# Patient Record
Sex: FEMALE | Race: WHITE | NOT HISPANIC OR LATINO | URBAN - NONMETROPOLITAN AREA
[De-identification: names, ages, dates, MRNs, and addresses within clinical notes are randomized per-mention and may not be internally consistent; named-entity substitution may affect disease eponyms.]

---

## 2017-01-09 NOTE — PROCEDURE NOTE: CLINICAL
Prior to injection, risks/benefits/alternatives discussed including infection, loss of vision, hemorrhage, cataract, glaucoma, retinal tears or detachment and patient wished to proceed. Topical anesthesia was induced with Alcaine. Additional anesthesia was achieved using drop(s) or injection checked above. A drop of Povidone-iodine 5% ophthalmic solution was instilled over the injection site and in the inferior fornix. Betadine prep was performed. A single use vial of intravitreal Lucentis 0.5mg/0.05ml was used and excess discarded. The needle was passed 3.0 mm posterior to the limbus in pseudophakic patients, and 3.5 mm posterior to the limbus in phakic patients. The remainder of the Lucentis 0.5mg in the single-use vial was then discarded in a medical waste disposal container. The eye was irrigated with sterile irrigating solution. Patient tolerated the procedure well. There were no complications. Post procedure instructions given. Injection Time 1:48 PM. CF vision checked. The patient was instructed to return for re-evaluation in approximately 4-12 weeks depending on his/her condition and was told to call immediately if vision decreases and/or if his/her eye becomes red, painful, and/or light sensitive. The patient was instructed to go to the emergency room or call 911 if unable to reach the doctor within an hour or two of trying or calling. The patient was instructed to use Artificial Tears q.i.d. p.r.n for comfort.

## 2017-03-07 NOTE — PROCEDURE NOTE: CLINICAL
PROCEDURE NOTE: Lucentis 0.5 mg OD. Diagnosis: Branch Retinal Vein Occlusion with Macular Edema. Anesthesia: Topical. Prep: Betadine Flush. Prior to injection, risks/benefits/alternatives discussed including infection, loss of vision, hemorrhage, cataract, glaucoma, retinal tears or detachment and patient wished to proceed. Topical anesthesia was induced with Alcaine. Additional anesthesia was achieved using drop(s) or injection checked above. A drop of Povidone-iodine 5% ophthalmic solution was instilled over the injection site and in the inferior fornix. Betadine prep was performed. A single use vial of intravitreal Lucentis 0.5mg/0.05ml was used and excess discarded. The needle was passed 3.0 mm posterior to the limbus in pseudophakic patients, and 3.5 mm posterior to the limbus in phakic patients. The remainder of the Lucentis 0.5mg in the single-use vial was then discarded in a medical waste disposal container. The eye was irrigated with sterile irrigating solution. Patient tolerated the procedure well. There were no complications. Post procedure instructions given. Injection Time 2:55 PM. CF vision checked. The patient was instructed to return for re-evaluation in approximately 4-12 weeks depending on his/her condition and was told to call immediately if vision decreases and/or if his/her eye becomes red, painful, and/or light sensitive. The patient was instructed to go to the emergency room or call 911 if unable to reach the doctor within an hour or two of trying or calling. The patient was instructed to use Artificial Tears q.i.d. p.r.n for comfort. Corie Whitfield

## 2017-05-01 NOTE — PROCEDURE NOTE: CLINICAL
PROCEDURE NOTE: Lucentis 0.5 mg OD. Diagnosis: Branch Retinal Vein Occlusion with Macular Edema. Prior to injection, risks/benefits/alternatives discussed including infection, loss of vision, hemorrhage, cataract, glaucoma, retinal tears or detachment and patient wished to proceed. Topical anesthesia was induced with Alcaine. Additional anesthesia was achieved using drop(s) or injection checked above. A drop of Povidone-iodine 5% ophthalmic solution was instilled over the injection site and in the inferior fornix. Betadine prep was performed. A single use vial of intravitreal Lucentis 0.5mg/0.05ml was used and excess discarded. The needle was passed 3.0 mm posterior to the limbus in pseudophakic patients, and 3.5 mm posterior to the limbus in phakic patients. The remainder of the Lucentis 0.5mg in the single-use vial was then discarded in a medical waste disposal container. The eye was irrigated with sterile irrigating solution. Patient tolerated the procedure well. There were no complications. Post procedure instructions given. Injection Time *. CF vision checked. The patient was instructed to return for re-evaluation in approximately 4-12 weeks depending on his/her condition and was told to call immediately if vision decreases and/or if his/her eye becomes red, painful, and/or light sensitive. The patient was instructed to go to the emergency room or call 911 if unable to reach the doctor within an hour or two of trying or calling. The patient was instructed to use Artificial Tears q.i.d. p.r.n for comfort. Mj Sullivan

## 2017-10-27 PROBLEM — H43.823: Noted: 2019-01-04

## 2017-10-27 PROBLEM — H40.1131: Noted: 2017-10-27

## 2017-10-27 PROBLEM — H04.123: Noted: 2017-10-27

## 2017-10-27 PROBLEM — Z96.1: Noted: 2017-10-27

## 2019-01-04 ENCOUNTER — IMPORTED ENCOUNTER (OUTPATIENT)
Dept: URBAN - NONMETROPOLITAN AREA CLINIC 1 | Facility: CLINIC | Age: 74
End: 2019-01-04

## 2019-01-04 PROCEDURE — 99214 OFFICE O/P EST MOD 30 MIN: CPT

## 2019-01-04 PROCEDURE — 92083 EXTENDED VISUAL FIELD XM: CPT

## 2019-01-04 PROCEDURE — 92134 CPTRZ OPH DX IMG PST SGM RTA: CPT

## 2019-01-04 NOTE — PATIENT DISCUSSION
POAG OUDiscussed diagnosis in detail with patient. Discussed the chronic progressive nature of this disease and various treatment options with patient. VF done today; central depression OU recommend OCT today to r/o ARMDOCT done today; shows VMT OU IOP stable at 10 OU. Cup to Disc ratio is 0.8 OU. Continue Simbrinza QD OU (ok to use at night)Continue to monitor. RTC 3 months with Optos Pseudophakia OUDiscussed diagnosis in detail with patient. Both intraocular lenses in place today and stable. Continue to monitor. CHRISTY OUDiscussed the diagnosis with patient. Discussed the chronic nature and treatment options with the patient. 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 Continue Systane BID OU may need to increase tears throughout the day. May rec pt see Dr. Abraham Dow if further Tx needed and no improvement at next visit. Continue to monitor. Visual field defect OURecommend OCT today; shows VMT OU No treatment indicated at this time. Continue to monitor.; 's Notes: MR 10/27/17DFE 1/4/19Optos 6/18/18OCT 10/1/18Pach 5/20/15VF 1/4/19Gonio 10/1/18

## 2019-04-05 ENCOUNTER — IMPORTED ENCOUNTER (OUTPATIENT)
Dept: URBAN - NONMETROPOLITAN AREA CLINIC 1 | Facility: CLINIC | Age: 74
End: 2019-04-05

## 2019-04-05 PROCEDURE — 99214 OFFICE O/P EST MOD 30 MIN: CPT

## 2019-04-05 PROCEDURE — 92250 FUNDUS PHOTOGRAPHY W/I&R: CPT

## 2019-04-05 NOTE — PATIENT DISCUSSION
POAG OUDiscussed diagnosis in detail with patient. Discussed the chronic progressive nature of this disease and various treatment options with patient. Optos done today; stable with glaucomatous cupping OUIOP stable at 11 OU. Cup to Disc ratio is 0.8 OU. Continue Simbrinza QD OU (ok to use at night)Continue to monitor. RTC 3 months with OCTPseudophakia OUDiscussed diagnosis in detail with patient. Both intraocular lenses in place today and stable. Continue to monitor. CHRISTY OUDiscussed the diagnosis with patient. Discussed the chronic nature and treatment options with the patient. 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 Continue Systane BID OU may need to increase tears throughout the day. May rec pt see Dr. Zahida Alanis if further Tx needed and no improvement at next visit. Continue to monitor. VMT OU Discussed diagnosis in detail with patient. Appears stable on today's exam.No treatment indicated at this time. Continue to monitor. Presbyopia OUDiscussed refractive status in detail with patient. New glasses Rx given today. Continue to monitor.; 's Notes: MR 4/5/19DFE  4/5/19Optos 4/5/19OCT 10/1/18Pach 5/20/15VF 1/4/19Gonio 10/1/18

## 2019-04-29 ENCOUNTER — IMPORTED ENCOUNTER (OUTPATIENT)
Dept: URBAN - NONMETROPOLITAN AREA CLINIC 1 | Facility: CLINIC | Age: 74
End: 2019-04-29

## 2019-04-29 PROBLEM — H40.1131: Noted: 2019-04-29

## 2019-04-29 PROBLEM — Z96.1: Noted: 2019-04-29

## 2019-04-29 PROBLEM — H04.123: Noted: 2019-04-29

## 2019-04-29 PROBLEM — H43.823: Noted: 2019-01-04

## 2019-04-29 NOTE — PATIENT DISCUSSION
GLRx check vs. DESDiscussed findings with patient today. Patient came in today complaining of blurry vision. Signs/symptoms associated discussed with dryness. She currently uses TheraTears TID-QID PRN but not working well alone. Sample of Reading given today use OU BID. Continue TheraTears as well. Once we get the dryness under better control we will recheck GLRx. RTC 2 weeks for recheck. --------- NOTES FROM BELOW ARE FROM LAST VISIT ---------------POAG OUDiscussed diagnosis in detail with patient. Discussed the chronic progressive nature of this disease and various treatment options with patient. Optos done today; stable with glaucomatous cupping OUIOP stable at 11 OU. Cup to Disc ratio is 0.8 OU. Continue Simbrinza QD OU (ok to use at night)Continue to monitor. RTC 3 months with OCTPseudophakia OUDiscussed diagnosis in detail with patient. Both intraocular lenses in place today and stable. Continue to monitor. CHRISTY OUDiscussed the diagnosis with patient. Discussed the chronic nature and treatment options with the patient. 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 Continue Systane BID OU may need to increase tears throughout the day. May rec pt see Dr. Megan Constantino if further Tx needed and no improvement at next visit. Continue to monitor. VMT OU Discussed diagnosis in detail with patient. Appears stable on today's exam.No treatment indicated at this time. Continue to monitor. Presbyopia OUDiscussed refractive status in detail with patient. New glasses Rx given today. Continue to monitor.; 's Notes: MR 4/5/19DFE  4/5/19Optos 4/5/19OCT 10/1/18Pach 5/20/15VF 1/4/19Gonio 10/1/18

## 2019-05-13 ENCOUNTER — IMPORTED ENCOUNTER (OUTPATIENT)
Dept: URBAN - NONMETROPOLITAN AREA CLINIC 1 | Facility: CLINIC | Age: 74
End: 2019-05-13

## 2019-05-13 PROCEDURE — 92012 INTRM OPH EXAM EST PATIENT: CPT

## 2019-05-13 NOTE — PATIENT DISCUSSION
CHRISTY OUDiscussed findings with patient today. Signs/symptoms associated discussed with dryness. SPK improved on today's examContinue Xiidra BID OU samples given today. Continue TheraTears as well. Once we get the dryness under better control we will recheck GLRx. RTC 4 weeks for recheck. --------- NOTES FROM BELOW ARE FROM LAST VISIT ---------------POAG OUDiscussed diagnosis in detail with patient. Discussed the chronic progressive nature of this disease and various treatment options with patient. Optos done today; stable with glaucomatous cupping OUIOP stable at 11 OU. Cup to Disc ratio is 0.8 OU. Continue Simbrinza QD OU (ok to use at night)Continue to monitor. RTC 3 months with OCTPseudophakia OUDiscussed diagnosis in detail with patient. Both intraocular lenses in place today and stable. Continue to monitor. CHRISTY OUDiscussed the diagnosis with patient. Discussed the chronic nature and treatment options with the patient. 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 Continue Systane BID OU may need to increase tears throughout the day. May rec pt see Dr. Benjie Homans if further Tx needed and no improvement at next visit. Continue to monitor. VMT OU Discussed diagnosis in detail with patient. Appears stable on today's exam.No treatment indicated at this time. Continue to monitor. Presbyopia OUDiscussed refractive status in detail with patient. .Continue to monitor.; 's Notes: MR 4/5/19DFE  4/5/19Optos 4/5/19OCT 10/1/18Pach 5/20/15VF 1/4/19Gonio 10/1/18

## 2019-06-17 ENCOUNTER — IMPORTED ENCOUNTER (OUTPATIENT)
Dept: URBAN - NONMETROPOLITAN AREA CLINIC 1 | Facility: CLINIC | Age: 74
End: 2019-06-17

## 2019-06-17 PROCEDURE — 92012 INTRM OPH EXAM EST PATIENT: CPT

## 2019-06-17 NOTE — PATIENT DISCUSSION
CHRISTY OUDiscussed findings with patient today. Signs/symptoms associated discussed with dryness. SPK improved on today's examContinue Xiidra BID OU samples given today. Continue TheraTears as well. Continue to monitor. POAG OUDiscussed diagnosis in detail with patient. Discussed the chronic progressive nature of this disease and various treatment options with patient. IOP stable at 12 OU. Cup to Disc ratio is 0.8 OU. Continue Simbrinza QD OU (ok to use at night)Continue to monitor. RTC 3 months with OCTPseudophakia OUDiscussed diagnosis in detail with patient. Both intraocular lenses in place today and stable. Continue to monitor. VMT OU Discussed diagnosis in detail with patient. Appears stable on today's exam.No treatment indicated at this time. Continue to monitor. Presbyopia OUDiscussed refractive status in detail with patient. .Continue to monitor.; 's Notes: MR 4/5/19DFE  4/5/19Optos 4/5/19OCT 10/1/18Pach 5/20/15VF 1/4/19Gonio 10/1/18

## 2019-07-08 ENCOUNTER — IMPORTED ENCOUNTER (OUTPATIENT)
Dept: URBAN - NONMETROPOLITAN AREA CLINIC 1 | Facility: CLINIC | Age: 74
End: 2019-07-08

## 2019-07-08 PROBLEM — Z96.1: Noted: 2019-04-29

## 2019-07-08 PROBLEM — H04.123: Noted: 2019-07-08

## 2019-07-08 PROBLEM — Z96.1: Noted: 2021-05-17

## 2019-07-08 PROBLEM — H04.123: Noted: 2021-05-17

## 2019-07-08 PROBLEM — H43.823: Noted: 2019-01-04

## 2019-07-08 PROBLEM — H40.1131: Noted: 2019-07-08

## 2019-07-08 PROBLEM — H35.3131: Noted: 2021-05-17

## 2019-07-08 PROBLEM — H43.823: Noted: 2021-05-17

## 2019-07-08 PROCEDURE — 92133 CPTRZD OPH DX IMG PST SGM ON: CPT

## 2019-07-08 PROCEDURE — 99214 OFFICE O/P EST MOD 30 MIN: CPT

## 2019-07-08 NOTE — PATIENT DISCUSSION
POAG OUDiscussed diagnosis in detail with patient. Discussed the chronic progressive nature of this disease and various treatment options with patient. OCT done today; borderline superior NFL thinning OU. IOP stable at 13 OD and 12 OS. Cup to Disc ratio is 0.8 OU. Continue Simbrinza QD OU (ok to use at night)Continue to monitor. RTC 3 months with Gonio CHRISTY OUDiscussed findings with patient today. Signs/symptoms associated discussed with dryness. SPK improved on today's examContinue Xiidra BID OU. Continue TheraTears as well. Continue to monitor. Pseudophakia OUDiscussed diagnosis in detail with patient. Both intraocular lenses in place today and stable. Continue to monitor. VMT OU Discussed diagnosis in detail with patient. Appears stable on today's exam.No treatment indicated at this time. Continue to monitor. Presbyopia OUDiscussed refractive status in detail with patient. .Continue to monitor.; 's Notes: MR 4/5/19DFE  4/5/19Optos 4/5/19OCT 7/8/19Pach 5/20/15VF 1/4/19Gonio 10/1/18

## 2019-10-18 ENCOUNTER — IMPORTED ENCOUNTER (OUTPATIENT)
Dept: URBAN - NONMETROPOLITAN AREA CLINIC 1 | Facility: CLINIC | Age: 74
End: 2019-10-18

## 2019-10-18 PROCEDURE — 99214 OFFICE O/P EST MOD 30 MIN: CPT

## 2019-10-18 PROCEDURE — 92020 GONIOSCOPY: CPT

## 2019-10-18 NOTE — PATIENT DISCUSSION
POAG OUDiscussed diagnosis in detail with patient. Discussed the chronic progressive nature of this disease and various treatment options with patient. 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 IOP stable at 12 OU. Cup to Disc ratio is 0.8 OU. Continue Simbrinza QD OU (ok to use at night)Continue to monitor. RTC 3 months with VF 24-2DES OUDiscussed findings with patient today. Signs/symptoms associated discussed with dryness. 2+SPK noted OUStart Lotemax SM BID OU use until sample is empty. Continue Xiidra BID OU. Continue TheraTears as well. Continue to monitor. Pseudophakia OUDiscussed diagnosis in detail with patient. Both intraocular lenses in place today and stable. Continue to monitor. VMT OU Discussed diagnosis in detail with patient. Appears stable on today's exam.No treatment indicated at this time. Continue to monitor. Presbyopia OUDiscussed refractive status in detail with patient. .Continue to monitor.; 's Notes: MR 4/5/19DFE  4/5/19Optos 4/5/19OCT 7/8/19Pach 5/20/15VF 1/4/19Gonio 10/18/19

## 2020-01-10 ENCOUNTER — IMPORTED ENCOUNTER (OUTPATIENT)
Dept: URBAN - NONMETROPOLITAN AREA CLINIC 1 | Facility: CLINIC | Age: 75
End: 2020-01-10

## 2020-01-10 PROCEDURE — 99214 OFFICE O/P EST MOD 30 MIN: CPT

## 2020-01-10 PROCEDURE — 92083 EXTENDED VISUAL FIELD XM: CPT

## 2020-01-10 NOTE — PATIENT DISCUSSION
POAG OUDiscussed diagnosis in detail with patient. Discussed the chronic progressive nature of this disease and various treatment options with patient. VF done today; central defect OU. Recommend refer to [facility] for further evaluation. IOP stable at 11 OD and 12 OS. Cup to Disc ratio is 0.8 OU. Continue Simbrinza QD OU (ok to use at night)Continue to monitor. CHRISTY OUDiscussed findings with patient today. Signs/symptoms associated discussed with dryness. Trace SPK noted OUContinue Doxycycline 50 mg. Continue Xiidra BID OU. Continue TheraTears as well. Continue to monitor. Pseudophakia OUDiscussed diagnosis in detail with patient. Both intraocular lenses in place today and stable. Continue to monitor. VMT OU Discussed diagnosis in detail with patient. Appears stable on today's exam.No treatment indicated at this time. Continue to monitor. Presbyopia OUDiscussed refractive status in detail with patient. .Continue to monitor.; 's Notes: MR 4/5/19DFE  4/5/19Optos 4/5/19OCT 7/8/19Pach 5/20/15VF 1/10/20Gonio 10/18/19

## 2021-05-17 ENCOUNTER — IMPORTED ENCOUNTER (OUTPATIENT)
Dept: URBAN - NONMETROPOLITAN AREA CLINIC 1 | Facility: CLINIC | Age: 76
End: 2021-05-17

## 2021-05-17 PROCEDURE — 92134 CPTRZ OPH DX IMG PST SGM RTA: CPT

## 2021-05-17 PROCEDURE — 92014 COMPRE OPH EXAM EST PT 1/>: CPT

## 2021-05-17 NOTE — PATIENT DISCUSSION
POAG OUDiscussed diagnosis in detail with patient. Discussed the chronic progressive nature of this disease and various treatment options with patient. VF done previoulsy; central defect OU. IOP at 10 OU. Cup to Disc ratio is 0.8 OU. Continue Simbrinza QD OU (ok to use at night)Continue to monitor. RTC in 6 months with ON OCT ARMD OUDiscussed diagnosis with patient. Patient is being followed at [address] [place] [address] and at [facility]. Discussed the chronic nature of this disease and limited treatment options. Continue taking eye vitamins daily and monitoring vision for changes with [employer]. Continue follow up care with [facility]. Continue to monitor. CHRISTY OUDiscussed findings with patient today. Signs/symptoms associated discussed with dryness. Trace SPK noted OUContinue Doxycycline 50 mg. Continue Xiidra BID OU. Continue TheraTears as well. Continue to monitor. Pseudophakia OUDiscussed diagnosis in detail with patient. Both intraocular lenses in place today and stable. Continue to monitor. VMT OU Discussed diagnosis in detail with patient. Appears stable on today's exam.No treatment indicated at this time. Continue to monitor. Presbyopia OUDiscussed refractive status in detail with patient. MR done today but no improvement. Recommend patient to see Dr. Sarah Crow for low vision aid. Continue to monitor.; 's Notes: MR 4/5/19DFE  5/17/21Optos 4/5/19OCT 5/17/21Pach 5/20/15VF 1/10/20Gonio 10/18/19

## 2022-03-11 ENCOUNTER — FOLLOW UP (OUTPATIENT)
Dept: URBAN - NONMETROPOLITAN AREA CLINIC 1 | Facility: CLINIC | Age: 77
End: 2022-03-11

## 2022-03-11 DIAGNOSIS — H40.1131: ICD-10-CM

## 2022-03-11 PROCEDURE — 92133 CPTRZD OPH DX IMG PST SGM ON: CPT

## 2022-03-11 PROCEDURE — 99214 OFFICE O/P EST MOD 30 MIN: CPT

## 2022-03-11 ASSESSMENT — PACHYMETRY
OS_CT_UM: 579
OD_CT_UM: 577

## 2022-03-11 ASSESSMENT — TONOMETRY
OD_IOP_MMHG: 13
OS_IOP_MMHG: 15

## 2022-03-11 ASSESSMENT — VISUAL ACUITY: OD_CC: CF 2FT

## 2022-04-10 ASSESSMENT — TONOMETRY
OS_IOP_MMHG: 12
OD_IOP_MMHG: 12
OD_IOP_MMHG: 11
OD_IOP_MMHG: 12
OS_IOP_MMHG: 12
OD_IOP_MMHG: 11
OD_IOP_MMHG: 12
OD_IOP_MMHG: 13
OS_IOP_MMHG: 10
OD_IOP_MMHG: 10
OD_IOP_MMHG: 10
OS_IOP_MMHG: 12
OS_IOP_MMHG: 10
OS_IOP_MMHG: 12
OS_IOP_MMHG: 12
OS_IOP_MMHG: 11

## 2022-04-10 ASSESSMENT — PACHYMETRY
OS_CT_UM: 579; ADJ: THICK
OS_CT_UM: 579; ADJ: THICK
OD_CT_UM: 577; ADJ: THICK
OS_CT_UM: 579; ADJ: THICK
OD_CT_UM: 577; ADJ: THICK
OS_CT_UM: 579; ADJ: THICK
OD_CT_UM: 577; ADJ: THICK
OS_CT_UM: 579; ADJ: THICK
OS_CT_UM: 579; ADJ: THICK
OD_CT_UM: 577; ADJ: THICK
OD_CT_UM: 577; ADJ: THICK
OS_CT_UM: 579; ADJ: THICK

## 2022-04-10 ASSESSMENT — VISUAL ACUITY
OS_PH: 20/30
OD_SC: 20/30-
OD_SC: 20/30-
OD_SC: 20/25-
OD_SC: 20/40+1
OS_SC: 20/40
OS_SC: 20/40+2
OD_CC: J3
OS_SC: 20/30-
OD_SC: 20/30-
OS_CC: J3
OS_SC: 20/30-
OD_SC: 20/30+
OS_PH: 20/30
OS_SC: 20/40+
OS_SC: 20/200
OS_SC: 20/50-
OD_SC: 20/200+
OD_SC: 20/30
OS_SC: 20/30-
OD_SC: 20/30+2
OS_SC: 20/40+1

## 2022-09-12 ENCOUNTER — FOLLOW UP (OUTPATIENT)
Dept: URBAN - NONMETROPOLITAN AREA CLINIC 1 | Facility: CLINIC | Age: 77
End: 2022-09-12

## 2022-09-12 DIAGNOSIS — H40.1131: ICD-10-CM

## 2022-09-12 PROCEDURE — 92250 FUNDUS PHOTOGRAPHY W/I&R: CPT

## 2022-09-12 PROCEDURE — 92014 COMPRE OPH EXAM EST PT 1/>: CPT

## 2022-09-12 ASSESSMENT — VISUAL ACUITY
OS_CC: 20/250
OU_CC: 20/125
OD_CC: 20/250

## 2022-09-12 ASSESSMENT — TONOMETRY
OD_IOP_MMHG: 13
OS_IOP_MMHG: 14